# Patient Record
Sex: MALE | Race: WHITE | URBAN - METROPOLITAN AREA
[De-identification: names, ages, dates, MRNs, and addresses within clinical notes are randomized per-mention and may not be internally consistent; named-entity substitution may affect disease eponyms.]

---

## 2017-12-29 ENCOUNTER — OFFICE VISIT (OUTPATIENT)
Dept: OPTOMETRY | Facility: CLINIC | Age: 27
End: 2017-12-29

## 2017-12-29 DIAGNOSIS — H52.13 MYOPIA OF BOTH EYES: Primary | ICD-10-CM

## 2017-12-29 PROCEDURE — 92015 DETERMINE REFRACTIVE STATE: CPT | Performed by: OPTOMETRIST

## 2017-12-29 PROCEDURE — 92012 INTRM OPH EXAM EST PATIENT: CPT | Performed by: OPTOMETRIST

## 2017-12-29 RX ORDER — CEPHALEXIN 500 MG/1
CAPSULE ORAL
Refills: 1 | COMMUNITY
Start: 2017-12-12 | End: 2020-10-02

## 2017-12-29 NOTE — ASSESSMENT & PLAN NOTE
New glasses and contact lens RX given today. I assured patient that due to the fact that his cornea is clear, he is fully correctable to 20/20, keratometry readings are normal he does not have keratoconus at this time.

## 2017-12-29 NOTE — PATIENT INSTRUCTIONS
Myopia  New glasses and contact lens RX given today. I assured patient that due to the fact that his cornea is clear, he is fully correctable to 20/20, keratometry readings are normal he does not have keratoconus at this time.

## 2017-12-29 NOTE — PROGRESS NOTES
Halina Gaines is a 33year old male. HPI:     HPI     Patient is in for an annual contact lens exam. Patient is happy with his AV 2 lenses comfort and vision.  He over wore his last pair by a few days but generally disposes of after two weeks daily wea Visual Acuity (Snellen - Linear)       Right Left    Dist cc 20/20 20/20    Near cc 4pt 4pt    Correction:  Contacts          Tonometry (Non-contact air puff, 9:27 AM)       Right Left    Pressure 12 12          Pupils       Pupils    Right PERRL    Left P Frequency:  Every 2 weeks    Solutions Used:  OptiFree Pure Moist          Keratometry       K1 K2    Right 41.50 42.50    Left 41.50 42.25          Final Contact Lens Rx       Brand Base Curve Diameter Sphere    Right Acuvue 2 8.70 14.0 -1.75    Left Acuv

## 2019-03-22 ENCOUNTER — OFFICE VISIT (OUTPATIENT)
Dept: OPTOMETRY | Facility: CLINIC | Age: 29
End: 2019-03-22
Payer: COMMERCIAL

## 2019-03-22 DIAGNOSIS — H52.13 MYOPIA OF BOTH EYES: Primary | ICD-10-CM

## 2019-03-22 PROCEDURE — 92012 INTRM OPH EXAM EST PATIENT: CPT | Performed by: OPTOMETRIST

## 2019-03-22 PROCEDURE — 92015 DETERMINE REFRACTIVE STATE: CPT | Performed by: OPTOMETRIST

## 2019-03-22 RX ORDER — SILDENAFIL CITRATE 20 MG/1
TABLET ORAL
COMMUNITY
Start: 2019-03-11

## 2019-03-22 RX ORDER — PANTOPRAZOLE SODIUM 40 MG/1
TABLET, DELAYED RELEASE ORAL
Refills: 0 | COMMUNITY
Start: 2019-01-29

## 2019-03-22 NOTE — PROGRESS NOTES
Sarika Rivas is a 33year old male. HPI:     HPI     Patient is in for an annual eye exam . He is happy with his AV 2 lenses. Used to wear dailies but prefers these better.  He has one older lens and one new lens in    Last edited by Estee Johnson, OD on Nettie Blevins on 3/22/2019 11:20 AM. (History)          PHYSICAL EXAM:     Base Eye Exam     Visual Acuity (Snellen - Linear)       Right Left    Dist cc 20/20 20/20    Near cc 4pt 4pt    Correction:  Contacts          Tonometry (Non-contact air puff, 11:33 AM) Contact Lens Rx       Brand Base Curve Diameter Sphere    Right Acuvue 2 8.70 14.0 -2.00    Left Acuvue 2 8.70 14.0 -2.00    Expiration Date:  3/22/2020                 ASSESSMENT/PLAN:     Diagnoses and Plan:     Myopia  New glasses and contact lens RX gi

## 2019-03-22 NOTE — PROGRESS NOTES
Mendy Pardo is a 33year old male. HPI:     HPI     Patient is in for an annual eye exam . He is happy with his AV 2 lenses. Used to wear dailies but prefers these better.  He has one old an one new lens in    Last edited by Nidia Baker, OD on 3/22/20 on 3/22/2019 11:20 AM. (History)          PHYSICAL EXAM:     Base Eye Exam     Visual Acuity (Snellen - Linear)       Right Left    Dist cc 20/20 20/20    Near cc 4pt 4pt    Correction:  Contacts          Tonometry (Non-contact air puff, 11:33 AM)       Ri Brand Base Curve Diameter Sphere    Right Acuvue 2 8.70 14.0 -2.00    Left Acuvue 2 8.70 14.0 -2.00    Expiration Date:  3/22/2020                 ASSESSMENT/PLAN:     Diagnoses and Plan:     Myopia  New glasses and contact lens RX given today.

## 2019-03-22 NOTE — PROGRESS NOTES
Golda Schwab is a 33year old male. HPI:     HPI     Patient is in for an annual eye exam . He is happy with his AV 2 lenses. Used to wear dailies but prefers these better.  He has one older lens and one new lens in    Last edited by Leidy Whitlock, OD on Stefanie Berry on 3/22/2019 11:20 AM. (History)          PHYSICAL EXAM:     Base Eye Exam     Visual Acuity (Snellen - Linear)       Right Left    Dist cc 20/20 20/20    Near cc 4pt 4pt    Correction:  Contacts          Tonometry (Non-contact air puff, 11:33 AM) 41.50 42.25          Final Contact Lens Rx       Brand Base Curve Diameter Sphere    Right Acuvue 2 8.70 14.0 -2.00    Left Acuvue 2 8.70 14.0 -2.00    Expiration Date:  3/22/2020                 ASSESSMENT/PLAN:     Diagnoses and Plan:     Myopia  New gla

## 2020-08-14 ENCOUNTER — OFFICE VISIT (OUTPATIENT)
Dept: OPTOMETRY | Facility: CLINIC | Age: 30
End: 2020-08-14
Payer: COMMERCIAL

## 2020-08-14 DIAGNOSIS — H52.13 MYOPIA OF BOTH EYES: Primary | ICD-10-CM

## 2020-08-14 PROCEDURE — 92012 INTRM OPH EXAM EST PATIENT: CPT | Performed by: OPTOMETRIST

## 2020-08-14 NOTE — PROGRESS NOTES
Eliane Chun is a 35year old male. HPI:     HPI     Patient is in for an annual contact lens exam. He wears AV 2 lenses with no complaints. Disposes of after two weeks of daily wear and uses Optifree Puremoist solution.     Last edited by Marianne Azul, PHYSICAL EXAM:     Base Eye Exam     Visual Acuity (Snellen - Linear)       Right Left    Dist cc 20/20 20/20    Near cc 4pt 4pt    Correction:  Contacts          Tonometry (Non-contact air puff, 2:19 PM)       Right Left    Pressure 15 14          P placed in this encounter.       Meds This Visit:  Requested Prescriptions      No prescriptions requested or ordered in this encounter        Follow up instructions:  Return in about 1 year (around 8/14/2021) for Eye Exam.    8/14/2020  Scribed by: Eli Bray

## 2020-10-02 ENCOUNTER — OFFICE VISIT (OUTPATIENT)
Dept: OPHTHALMOLOGY | Facility: CLINIC | Age: 30
End: 2020-10-02
Payer: COMMERCIAL

## 2020-10-02 DIAGNOSIS — H53.8 FLASHING LIGHTS: ICD-10-CM

## 2020-10-02 DIAGNOSIS — H52.203 MYOPIA OF BOTH EYES WITH ASTIGMATISM: ICD-10-CM

## 2020-10-02 DIAGNOSIS — D31.32 CHOROIDAL NEVUS OF LEFT EYE: Primary | ICD-10-CM

## 2020-10-02 DIAGNOSIS — H52.13 MYOPIA OF BOTH EYES WITH ASTIGMATISM: ICD-10-CM

## 2020-10-02 PROCEDURE — 92250 FUNDUS PHOTOGRAPHY W/I&R: CPT | Performed by: OPHTHALMOLOGY

## 2020-10-02 PROCEDURE — 92015 DETERMINE REFRACTIVE STATE: CPT | Performed by: OPHTHALMOLOGY

## 2020-10-02 PROCEDURE — 92014 COMPRE OPH EXAM EST PT 1/>: CPT | Performed by: OPHTHALMOLOGY

## 2020-10-02 NOTE — PROGRESS NOTES
Rinku Arce is a 35year old male. HPI:     HPI     EP/ 34 yo M here for complete exam  Last visit.  8/14/20 with Dr. Marlyn Lyon (he declined dilation at that time)   LDE: 12/29/17  Patient has mild myopia and astigmatism OU and wears glasses and contacts (Snellen - Linear)       Right Left    Dist cc 20/20 20/20-    Correction: Contacts          Tonometry (Applanation, 11:34 AM)       Right Left    Pressure 14 14          Pupils       Pupils APD    Right PERRL None    Left PERRL None          Visual Fields Diameter Sphere    Right Acuvue 2 8.70 14.0 -2.00    Left Acuvue 2 8.70 14.0 -2.00    Expiration Date: 10/3/2022          Final Contact Lens Rx #2       Brand Base Curve Diameter Sphere    Right Sarabjit [place] Aqua Comfort Plus 8.70 14.0 -2.00    Left Sarabjit

## 2020-10-02 NOTE — PATIENT INSTRUCTIONS
Myopia with astigmatism  New glasses and contacts    Flashing lights  Right eye. Normal Retinal exam. No sign of vitreous floaters. Symptoms do not sound like ophthalmic migraine.   Follow up with Retina if symptoms persist.    Choroidal nevus of left eye